# Patient Record
Sex: FEMALE | Race: AMERICAN INDIAN OR ALASKA NATIVE | NOT HISPANIC OR LATINO | Employment: OTHER | URBAN - METROPOLITAN AREA
[De-identification: names, ages, dates, MRNs, and addresses within clinical notes are randomized per-mention and may not be internally consistent; named-entity substitution may affect disease eponyms.]

---

## 2023-08-30 NOTE — H&P (VIEW-ONLY)
[facility]      Preoperative Consultation   Kayley Menjivar   : 1945   Gender: female    Date of Encounter: 2023    Pre-Op Exam (23/Alesha Zayas/Dr. Chandra/Right knee replacement/Fax: [phone])           History of Present Illness   78 yo otherwise health female patient who presents for preoperative clearance for right knee replacement. She has very good endurance playing pickle ball and biking on her recumbent bike regularly.      Review of Systems   A comprehensive review of systems was negative except for items noted in HPI. The patient denies any fevers, chills, nausea, vomiting, diarrhea, constipation, dysuria, hematuria, or urinary hesitancy or incontinence.  No shortness of breath, chest pain, headaches, or rashes.      Patient Active Problem List   Diagnosis Code     Abdominal or pelvic swelling, mass or lump, unspecified site R19.00     Other specified counseling Z71.89     Osteopenia M85.80     Special screening for malignant neoplasms, colon Z12.11     Mammogram declined Z53.20     Current Outpatient Medications   Medication Sig     Cholecalciferol, Vitamin D3, 400 unit capsule Take by mouth.     diclofenac (VOLTAREN) 75 mg delayed-release tablet Take 75 mg by mouth two times daily with meals.     docusate (COLACE) 100 mg tablet Take by mouth once daily.     glucos sul 2KCl-msm-chond-C-Mn (Glucosamine Chondroitin) 550-30-1 mg cap      Omega-3-DHA-EPA-Fish Oil (Fish Oil) 1,000 mg (120 mg-180 mg) cap      No current facility-administered medications for this visit.     Medications have been reviewed by me and are current to the best of my knowledge and ability.     Allergies   Allergen Reactions     Bandaids [Adhesive Tape]      Prednisone Hives     Past Surgical History:   . Laterality Date     (IA) SURGICAL PROCEDURE      tubal ligation~D\T\C x2 ~     COLONOSCOPY N/A 2021    Dr. Sharp     SALPINGO-OOPHORECTOMY      patient can't recal which side, due to  "ovarian cyst     Social History     Tobacco Use     Smoking status: Never     Passive exposure: Never     Smokeless tobacco: Never   Vaping Use     Vaping Use: Never used   Substance Use Topics     Alcohol use: No     Comment: Alcoholic Drinks/day: 0     Drug use: No     Family History   Problem Relation Age of Onset     Genetic Other         maternal grandma breast cancer     Cancer-breast Maternal Grandmother      Other Mother          at 93     Cancer Father         colon     COPD Sister      Heart attack Sister      Colon polyps Brother      Melanoma Son        PAST DIFFICULTY WITH ANESTHESIA: None     Physical Exam   /74 (Cuff Site: Left Arm, Position: Sitting, Cuff Size: Adult Regular)   Pulse 70   Ht 1.6 m (5' 3\")   Wt 64.9 kg (143 lb)   SpO2 98%   BMI 25.33 kg/m   Body mass index is 25.33 kg/m .  Nursing notes and vitals reviewed. General - the patient is alert, oriented x3, and in no apparent distress.  HEENT - head is normocephalic/atraumatic.  Extraocular movements intact.    Sclerae and conjunctivae are clear.  Oral mucosa is moist.  Posterior pharynx is within normal limits.  Nasal passages are free of congestion.  Turbinates are clear.  Tympanic membranes are within normal limits bilaterally.  Neck is supple.  There is no lymphadenopathy, thyromegaly, or jugular venous distention.  Cardiac - regular, rate and rhythm.  No murmurs, rubs or gallops.  Lungs - clear to auscultation bilaterally.  No wheezes, rales, or rhonchi.  Integument - skin is intact, pink, warm and dry.  No rashes.  Psychiatric - the patient has normal mood, affect, and memory. The abdomen is soft without tenderness, guarding, mass, rebound or organomegaly. Bowel sounds are normal.      Assessment / Plan     The Pre-Op Tool    Recommendations      Intermediate Risk Procedure    Risk of CV Complication (RCRI)  0.5%    Current Cardiac Status  Good exertional capacity ( > 4 mets )    Cardiac History  No history of coronary " artery disease           Labs  HGB within last 30 days  EKG  Baseline EKG within the last 12 months  CXR  Not indicated    Stress Testing  Not indicated    * Testing recommendations are intended to assist, but not direct, clinical decisions.           Type & Screen should be obtained by Anesthesia only if the risk of transfusion is > 5% for the procedure          Labs  * Data supports elimination of  routine  laboratory testing in favor of focused,  indicated  testing based on medical co-morbidities. A 2009 study randomized 1061 patients undergoing ambulatory, non-cataract surgery to routine or to indicated testing. Perioperative adverse events were similar (Anesthesia & Analgesia 2009;108:467-75; Anesthesiol. Clin. 2016 Mar;34(1):43-58).  Stress Testing  * The current ACC/AHA guideline states that 'non-invasive stress testing is not useful for patients [with no clinical risk factors] undergoing noncardiac surgery' (JACC. 2014;64(21);e1-76.).     Session ID: [id number]  Endnotes and bibliography available upon request: [email or web address]      Labs:   Preop Visit on 08/30/2023   Component Date Value Ref Range Status     SODIUM 08/30/2023 131 (L)  136 - 145 mmol/L Final     POTASSIUM 08/30/2023 3.8  3.5 - 5.1 mmol/L Final     CHLORIDE 08/30/2023 96 (L)  98 - 107 mmol/L Final     CO2,TOTAL 08/30/2023 25  22 - 29 mmol/L Final     ANION GAP 08/30/2023 10  5 - 18 Final     GLUCOSE 08/30/2023 89  70 - 99 mg/dL Final     CALCIUM 08/30/2023 9.1  8.8 - 10.2 mg/dL Final     BUN 08/30/2023 9  8 - 23 mg/dL Final     CREATININE 08/30/2023 0.66  0.50 - 0.90 mg/dL Final     BUN/CREAT RATIO 08/30/2023 14  10 - 20 Final     eGFR 08/30/2023 >90  >90 mL/min/1.73m2 Final    As of 03/15/2022, eGFR is calculated by the CKD-EPI creatinine equation without race adjustment.  eGFR can be influenced by muscle mass, exercise, and diet.  The reported eGFR is an estimation only and is only applicable if the renal function is stable.      WHITE BLOOD COUNT         08/30/2023 6.0  4.5 - 11.0 thou/cu mm Final     RED BLOOD COUNT           08/30/2023 3.88 (L)  4.00 - 5.20 mil/cu mm Final     HEMOGLOBIN                08/30/2023 12.8  12.0 - 16.0 g/dL Final     HEMATOCRIT                08/30/2023 38.0  33.0 - 51.0 % Final     MCV                       08/30/2023 98  80 - 100 fL Final     MCH                       08/30/2023 33.0  26.0 - 34.0 pg Final     MCHC                      08/30/2023 33.7  32.0 - 36.0 g/dL Final     RDW                       08/30/2023 13.7  11.5 - 15.5 % Final     PLATELET COUNT            08/30/2023 273  140 - 440 thou/cu mm Final     MPV                       08/30/2023 10.1  6.5 - 11.0 fL Final     % NEUT 08/30/2023 72.7  % Final     % LYMPH 08/30/2023 20.9  % Final     % MXD (MONOS,EOS,BASOS) 08/30/2023 6.4  % Final     ABSOLUTE NEUTROPHILS      08/30/2023 4.3  1.7 - 7.0 thou/cu mm Final     ABSOLUTE LYMPHOCYTES      08/30/2023 1.3  0.9 - 2.9 thou/cu mm Final     MIXED ABS 3 PART 08/30/2023 0.4  0.2 - 1.2 thou/cu mm Final         ECG: EKG was reviewed by myself and shows a normal sinus rhythm.  Positive axis, good R wave progression and no evidence of ischemia.       ICD-10-CM    1. Pre-op evaluation  Z01.818 NM READING EKG - NO CHARGE, COMP ONLY     EKG 12 LEAD     NM ECG ROUTINE ECG W/LEAST 12 LDS W/I&R     CBC AND DIFFERENTIAL     BASIC METABOLIC PANEL        Patient is cleared for planned procedure.   Electronically Signed by:   MYNOR Johns ....................  8/31/2023   12:51 PM

## 2023-09-20 NOTE — PROGRESS NOTES
Pre-op Total Joint Patient Screening    Do you have a ride available to come to the hospital the day after your surgery by 8am with anticipated discharge of 11am? Y  What is the name of this person? Marquise(spouse)  Do you have a  set up after surgery? Y  Will your  be the same person that gives you a ride home after surgery? Y  Have you received the Joint Replacement Guidebook? Y  Do you have any questions about your guidebook? N  Have you signed up for Epic Care Companion? N  Have you signed up for MY Chart access? N

## 2023-09-21 RX ORDER — DICLOFENAC SODIUM 75 MG/1
75 TABLET, DELAYED RELEASE ORAL 2 TIMES DAILY
Status: ON HOLD | COMMUNITY
End: 2023-09-26

## 2023-09-21 RX ORDER — HYDROCORTISONE ACETATE 0.5 %
2 CREAM (GRAM) TOPICAL DAILY
COMMUNITY

## 2023-09-21 RX ORDER — ACETAMINOPHEN 500 MG
500-1000 TABLET ORAL EVERY 6 HOURS PRN
Status: ON HOLD | COMMUNITY
End: 2023-09-25

## 2023-09-21 RX ORDER — CHOLECALCIFEROL (VITAMIN D3) 50 MCG
1 TABLET ORAL DAILY
COMMUNITY

## 2023-09-21 RX ORDER — AMPICILLIN TRIHYDRATE 250 MG
600 CAPSULE ORAL DAILY
COMMUNITY

## 2023-09-21 RX ORDER — AMOXICILLIN 500 MG
1200 CAPSULE ORAL DAILY
COMMUNITY

## 2023-09-22 RX ORDER — ASPIRIN 81 MG
100 TABLET, DELAYED RELEASE (ENTERIC COATED) ORAL DAILY
COMMUNITY

## 2023-09-22 NOTE — PROGRESS NOTES
PTA medications updated by Medication Scribe prior to surgery via phone call with patient (last doses completed by Nurse)     Medication history sources: Patient, H&P, and Patient's home med list  In the past week, patient estimated taking medication this percent of the time: Greater than 90%      Significant changes made to the medication list:  None      Additional medication history information:   None    Medication reconciliation completed by provider prior to medication history? No    Time spent in this activity: 30 minutes    The information provided in this note is only as accurate as the sources available at the time of update(s)      Prior to Admission medications    Medication Sig Last Dose Taking? Auth Provider Long Term End Date   acetaminophen (TYLENOL) 500 MG tablet Take 500-1,000 mg by mouth every 6 hours as needed for mild pain Unknown at PRN Yes Reported, Patient     diclofenac (VOLTAREN) 75 MG EC tablet Take 75 mg by mouth 2 times daily 9/19/2023 at PM Yes Reported, Patient Yes    docusate sodium (COLACE) 100 MG tablet Take 100 mg by mouth daily  at AM Yes Reported, Patient     Glucosamine-Chondroit-Vit C-Mn (GLUCOSAMINE 1500 COMPLEX) CAPS Take 2 capsules by mouth daily 9/15/2023 at AM Yes Reported, Patient     Omega-3 Fatty Acids (FISH OIL) 1200 MG capsule Take 1,200 mg by mouth daily 9/15/2023 at AM Yes Reported, Patient     red yeast rice 600 MG CAPS Take 600 mg by mouth daily 9/15/2023 at AM Yes Reported, Patient     vitamin D3 (CHOLECALCIFEROL) 50 mcg (2000 units) tablet Take 1 tablet by mouth daily 9/15/2023 at AM Yes Reported, Patient

## 2023-09-24 ENCOUNTER — ANESTHESIA EVENT (OUTPATIENT)
Dept: SURGERY | Facility: CLINIC | Age: 78
End: 2023-09-24
Payer: COMMERCIAL

## 2023-09-24 NOTE — ANESTHESIA PREPROCEDURE EVALUATION
Anesthesia Pre-Procedure Evaluation    Patient: Kayley Menjivar   MRN: [id number] : 1945        Procedure : Procedure(s):  RIGHT TOTAL KNEE ARTHROPLASTY          Past Medical History:   Diagnosis Date    Osteopenia       Past Surgical History:   Procedure Laterality Date     SECTION      COLONOSCOPY      SALPINGO OOPHORECTOMY,R/L/MARYBETH      pt unsure of which side    TUBAL LIGATION        Allergies   Allergen Reactions    Adhesive Tape      bandaids    Prednisone Hives      Social History     Tobacco Use    Smoking status: Never    Smokeless tobacco: Never   Substance Use Topics    Alcohol use: Not Currently      Wt Readings from Last 1 Encounters:   No data found for Wt        Anesthesia Evaluation            ROS/MED HX  ENT/Pulmonary:  - neg pulmonary ROS  (-) sleep apnea   Neurologic:  - neg neurologic ROS     Cardiovascular:  - neg cardiovascular ROS     METS/Exercise Tolerance:     Hematologic:       Musculoskeletal: Comment: osteopenia  (+)  arthritis,             GI/Hepatic:  - neg GI/hepatic ROS     Renal/Genitourinary:  - neg Renal ROS     Endo:       Psychiatric/Substance Use:  - neg psychiatric ROS     Infectious Disease:  - neg infectious disease ROS     Malignancy:       Other:            Physical Exam    Airway        Mallampati: II   TM distance: > 3 FB   Neck ROM: full   Mouth opening: > 3 cm    Respiratory Devices and Support         Dental       (+) Minor Abnormalities - some fillings, tiny chips      Cardiovascular   cardiovascular exam normal          Pulmonary   pulmonary exam normal                OUTSIDE LABS:  CBC: No results found for: WBC, HGB, HCT, PLT  BMP: No results found for: NA, POTASSIUM, CHLORIDE, CO2, BUN, CR, GLC  COAGS: No results found for: PTT, INR, FIBR  POC: No results found for: BGM, HCG, HCGS  HEPATIC: No results found for: ALBUMIN, PROTTOTAL, ALT, AST, GGT, ALKPHOS, BILITOTAL, BILIDIRECT, JASON  OTHER: No results found for: PH, LACT, A1C, ALEKSANDAR, PHOS, MAG,  LIPASE, AMYLASE, TSH, T4, T3, CRP, SED    Anesthesia Plan    ASA Status:  2    NPO Status:  NPO Appropriate    Anesthesia Type: Spinal.              Consents    Anesthesia Plan(s) and associated risks, benefits, and realistic alternatives discussed. Questions answered and patient/representative(s) expressed understanding.     - Discussed:     - Discussed with:  Patient            Postoperative Care    Pain management: IV analgesics, Peripheral nerve block (Single Shot), Multi-modal analgesia.   PONV prophylaxis: Ondansetron (or other 5HT-3), Dexamethasone or Solumedrol, Background Propofol Infusion     Comments:                Young Lopez, DO, DO

## 2023-09-25 ENCOUNTER — APPOINTMENT (OUTPATIENT)
Dept: GENERAL RADIOLOGY | Facility: CLINIC | Age: 78
End: 2023-09-25
Attending: ORTHOPAEDIC SURGERY
Payer: COMMERCIAL

## 2023-09-25 ENCOUNTER — APPOINTMENT (OUTPATIENT)
Dept: PHYSICAL THERAPY | Facility: CLINIC | Age: 78
End: 2023-09-25
Attending: ORTHOPAEDIC SURGERY
Payer: COMMERCIAL

## 2023-09-25 ENCOUNTER — ANESTHESIA (OUTPATIENT)
Dept: SURGERY | Facility: CLINIC | Age: 78
End: 2023-09-25
Payer: COMMERCIAL

## 2023-09-25 ENCOUNTER — HOSPITAL ENCOUNTER (OUTPATIENT)
Facility: CLINIC | Age: 78
Discharge: HOME | End: 2023-09-26
Attending: ORTHOPAEDIC SURGERY | Admitting: ORTHOPAEDIC SURGERY
Payer: COMMERCIAL

## 2023-09-25 DIAGNOSIS — Z96.651 TOTAL KNEE REPLACEMENT STATUS, RIGHT: Primary | ICD-10-CM

## 2023-09-25 LAB — GLUCOSE BLDC GLUCOMTR-MCNC: 86 MG/DL (ref 70–99)

## 2023-09-25 PROCEDURE — 258N000003 HC RX IP 258 OP 636: Performed by: ANESTHESIOLOGY

## 2023-09-25 PROCEDURE — 250N000009 HC RX 250: Performed by: ORTHOPAEDIC SURGERY

## 2023-09-25 PROCEDURE — 250N000009 HC RX 250: Performed by: ANESTHESIOLOGY

## 2023-09-25 PROCEDURE — 250N000011 HC RX IP 250 OP 636: Mod: JZ | Performed by: ORTHOPAEDIC SURGERY

## 2023-09-25 PROCEDURE — 250N000011 HC RX IP 250 OP 636: Performed by: ORTHOPAEDIC SURGERY

## 2023-09-25 PROCEDURE — 97530 THERAPEUTIC ACTIVITIES: CPT | Mod: GP

## 2023-09-25 PROCEDURE — 272N000001 HC OR GENERAL SUPPLY STERILE: Performed by: ORTHOPAEDIC SURGERY

## 2023-09-25 PROCEDURE — 999N000063 XR KNEE PORT RIGHT 1/2 VIEWS: Mod: RT

## 2023-09-25 PROCEDURE — 250N000011 HC RX IP 250 OP 636: Performed by: ANESTHESIOLOGY

## 2023-09-25 PROCEDURE — 258N000001 HC RX 258: Performed by: ORTHOPAEDIC SURGERY

## 2023-09-25 PROCEDURE — 97161 PT EVAL LOW COMPLEX 20 MIN: CPT | Mod: GP

## 2023-09-25 PROCEDURE — 710N000009 HC RECOVERY PHASE 1, LEVEL 1, PER MIN: Performed by: ORTHOPAEDIC SURGERY

## 2023-09-25 PROCEDURE — 999N000141 HC STATISTIC PRE-PROCEDURE NURSING ASSESSMENT: Performed by: ORTHOPAEDIC SURGERY

## 2023-09-25 PROCEDURE — 258N000003 HC RX IP 258 OP 636: Performed by: ORTHOPAEDIC SURGERY

## 2023-09-25 PROCEDURE — 250N000025 HC SEVOFLURANE, PER MIN: Performed by: ORTHOPAEDIC SURGERY

## 2023-09-25 PROCEDURE — 82962 GLUCOSE BLOOD TEST: CPT

## 2023-09-25 PROCEDURE — 360N000077 HC SURGERY LEVEL 4, PER MIN: Performed by: ORTHOPAEDIC SURGERY

## 2023-09-25 PROCEDURE — 370N000017 HC ANESTHESIA TECHNICAL FEE, PER MIN: Performed by: ORTHOPAEDIC SURGERY

## 2023-09-25 PROCEDURE — 97110 THERAPEUTIC EXERCISES: CPT | Mod: GP

## 2023-09-25 PROCEDURE — 250N000013 HC RX MED GY IP 250 OP 250 PS 637: Performed by: ORTHOPAEDIC SURGERY

## 2023-09-25 PROCEDURE — C1776 JOINT DEVICE (IMPLANTABLE): HCPCS | Performed by: ORTHOPAEDIC SURGERY

## 2023-09-25 PROCEDURE — C1713 ANCHOR/SCREW BN/BN,TIS/BN: HCPCS | Performed by: ORTHOPAEDIC SURGERY

## 2023-09-25 DEVICE — BONE CEMENT RADIOPAQUE SIMPLEX HV FULL DOSE 6194-1-001: Type: IMPLANTABLE DEVICE | Site: KNEE | Status: FUNCTIONAL

## 2023-09-25 DEVICE — IMP COMP PATELLA BIOM 3 PEG 34MM STD 184766: Type: IMPLANTABLE DEVICE | Site: KNEE | Status: FUNCTIONAL

## 2023-09-25 DEVICE — IMPLANTABLE DEVICE
Type: IMPLANTABLE DEVICE | Site: KNEE | Status: FUNCTIONAL
Brand: VANGUARD® KNEE SYSTEM

## 2023-09-25 DEVICE — IMP COMP TIBIAL KNEE ASCENT 71MM 141223: Type: IMPLANTABLE DEVICE | Site: KNEE | Status: FUNCTIONAL

## 2023-09-25 DEVICE — IMP COMP FEMORAL VANGARD BIOM FIXATION 183099: Type: IMPLANTABLE DEVICE | Site: KNEE | Status: FUNCTIONAL

## 2023-09-25 DEVICE — IMP COMP FEMORAL VANGARD BIOM PS RT 62.5MM 183106: Type: IMPLANTABLE DEVICE | Site: KNEE | Status: FUNCTIONAL

## 2023-09-25 RX ORDER — ONDANSETRON 4 MG/1
4 TABLET, ORALLY DISINTEGRATING ORAL EVERY 30 MIN PRN
Status: DISCONTINUED | OUTPATIENT
Start: 2023-09-25 | End: 2023-09-25 | Stop reason: HOSPADM

## 2023-09-25 RX ORDER — LIDOCAINE 40 MG/G
CREAM TOPICAL
Status: DISCONTINUED | OUTPATIENT
Start: 2023-09-25 | End: 2023-09-25

## 2023-09-25 RX ORDER — TRANEXAMIC ACID 10 MG/ML
1 INJECTION, SOLUTION INTRAVENOUS ONCE
Status: COMPLETED | OUTPATIENT
Start: 2023-09-25 | End: 2023-09-25

## 2023-09-25 RX ORDER — HYDROMORPHONE HCL IN WATER/PF 6 MG/30 ML
0.4 PATIENT CONTROLLED ANALGESIA SYRINGE INTRAVENOUS
Status: DISCONTINUED | OUTPATIENT
Start: 2023-09-25 | End: 2023-09-26 | Stop reason: HOSPADM

## 2023-09-25 RX ORDER — ONDANSETRON 2 MG/ML
INJECTION INTRAMUSCULAR; INTRAVENOUS PRN
Status: DISCONTINUED | OUTPATIENT
Start: 2023-09-25 | End: 2023-09-25

## 2023-09-25 RX ORDER — ACETAMINOPHEN 325 MG/1
650 TABLET ORAL EVERY 4 HOURS PRN
Status: DISCONTINUED | OUTPATIENT
Start: 2023-09-28 | End: 2023-09-26 | Stop reason: HOSPADM

## 2023-09-25 RX ORDER — HYDROMORPHONE HCL IN WATER/PF 6 MG/30 ML
0.2 PATIENT CONTROLLED ANALGESIA SYRINGE INTRAVENOUS
Status: DISCONTINUED | OUTPATIENT
Start: 2023-09-25 | End: 2023-09-26 | Stop reason: HOSPADM

## 2023-09-25 RX ORDER — DOCUSATE SODIUM 100 MG/1
100 CAPSULE, LIQUID FILLED ORAL DAILY
Status: DISCONTINUED | OUTPATIENT
Start: 2023-09-25 | End: 2023-09-26 | Stop reason: HOSPADM

## 2023-09-25 RX ORDER — PROPOFOL 10 MG/ML
INJECTION, EMULSION INTRAVENOUS PRN
Status: DISCONTINUED | OUTPATIENT
Start: 2023-09-25 | End: 2023-09-25

## 2023-09-25 RX ORDER — SODIUM CHLORIDE, SODIUM LACTATE, POTASSIUM CHLORIDE, CALCIUM CHLORIDE 600; 310; 30; 20 MG/100ML; MG/100ML; MG/100ML; MG/100ML
INJECTION, SOLUTION INTRAVENOUS CONTINUOUS
Status: DISCONTINUED | OUTPATIENT
Start: 2023-09-25 | End: 2023-09-25

## 2023-09-25 RX ORDER — OXYCODONE HYDROCHLORIDE 5 MG/1
5-10 TABLET ORAL EVERY 6 HOURS PRN
Qty: 33 TABLET | Refills: 0 | Status: SHIPPED | OUTPATIENT
Start: 2023-09-25 | End: 2023-09-25

## 2023-09-25 RX ORDER — ONDANSETRON 2 MG/ML
4 INJECTION INTRAMUSCULAR; INTRAVENOUS EVERY 30 MIN PRN
Status: DISCONTINUED | OUTPATIENT
Start: 2023-09-25 | End: 2023-09-25 | Stop reason: HOSPADM

## 2023-09-25 RX ORDER — OXYCODONE HYDROCHLORIDE 5 MG/1
5-10 TABLET ORAL EVERY 6 HOURS PRN
Qty: 33 TABLET | Refills: 0 | Status: SHIPPED | OUTPATIENT
Start: 2023-09-25 | End: 2023-09-26

## 2023-09-25 RX ORDER — GLYCOPYRROLATE 0.2 MG/ML
INJECTION, SOLUTION INTRAMUSCULAR; INTRAVENOUS PRN
Status: DISCONTINUED | OUTPATIENT
Start: 2023-09-25 | End: 2023-09-25

## 2023-09-25 RX ORDER — HYDROXYZINE HYDROCHLORIDE 10 MG/1
10 TABLET, FILM COATED ORAL EVERY 6 HOURS PRN
Qty: 40 TABLET | Refills: 0 | Status: SHIPPED | OUTPATIENT
Start: 2023-09-25

## 2023-09-25 RX ORDER — HYDROMORPHONE HCL IN WATER/PF 6 MG/30 ML
0.4 PATIENT CONTROLLED ANALGESIA SYRINGE INTRAVENOUS EVERY 5 MIN PRN
Status: DISCONTINUED | OUTPATIENT
Start: 2023-09-25 | End: 2023-09-25 | Stop reason: HOSPADM

## 2023-09-25 RX ORDER — CEFAZOLIN SODIUM 1 G/3ML
1 INJECTION, POWDER, FOR SOLUTION INTRAMUSCULAR; INTRAVENOUS EVERY 8 HOURS
Status: COMPLETED | OUTPATIENT
Start: 2023-09-25 | End: 2023-09-26

## 2023-09-25 RX ORDER — ACETAMINOPHEN 325 MG/1
650 TABLET ORAL EVERY 4 HOURS PRN
Qty: 100 TABLET | Refills: 0 | Status: SHIPPED | OUTPATIENT
Start: 2023-09-25

## 2023-09-25 RX ORDER — SODIUM CHLORIDE, SODIUM LACTATE, POTASSIUM CHLORIDE, CALCIUM CHLORIDE 600; 310; 30; 20 MG/100ML; MG/100ML; MG/100ML; MG/100ML
INJECTION, SOLUTION INTRAVENOUS CONTINUOUS
Status: DISCONTINUED | OUTPATIENT
Start: 2023-09-25 | End: 2023-09-25 | Stop reason: HOSPADM

## 2023-09-25 RX ORDER — PROPOFOL 10 MG/ML
INJECTION, EMULSION INTRAVENOUS CONTINUOUS PRN
Status: DISCONTINUED | OUTPATIENT
Start: 2023-09-25 | End: 2023-09-25

## 2023-09-25 RX ORDER — CEFAZOLIN SODIUM/WATER 2 G/20 ML
2 SYRINGE (ML) INTRAVENOUS
Status: COMPLETED | OUTPATIENT
Start: 2023-09-25 | End: 2023-09-25

## 2023-09-25 RX ORDER — ONDANSETRON 4 MG/1
4 TABLET, ORALLY DISINTEGRATING ORAL EVERY 6 HOURS PRN
Status: DISCONTINUED | OUTPATIENT
Start: 2023-09-25 | End: 2023-09-26 | Stop reason: HOSPADM

## 2023-09-25 RX ORDER — ACETAMINOPHEN 325 MG/1
975 TABLET ORAL EVERY 8 HOURS
Status: DISCONTINUED | OUTPATIENT
Start: 2023-09-25 | End: 2023-09-26 | Stop reason: HOSPADM

## 2023-09-25 RX ORDER — FENTANYL CITRATE 50 UG/ML
INJECTION, SOLUTION INTRAMUSCULAR; INTRAVENOUS PRN
Status: DISCONTINUED | OUTPATIENT
Start: 2023-09-25 | End: 2023-09-25

## 2023-09-25 RX ORDER — POLYETHYLENE GLYCOL 3350 17 G/17G
17 POWDER, FOR SOLUTION ORAL DAILY
Status: DISCONTINUED | OUTPATIENT
Start: 2023-09-26 | End: 2023-09-26 | Stop reason: HOSPADM

## 2023-09-25 RX ORDER — FENTANYL CITRATE 0.05 MG/ML
50 INJECTION, SOLUTION INTRAMUSCULAR; INTRAVENOUS EVERY 5 MIN PRN
Status: DISCONTINUED | OUTPATIENT
Start: 2023-09-25 | End: 2023-09-25 | Stop reason: HOSPADM

## 2023-09-25 RX ORDER — FENTANYL CITRATE 0.05 MG/ML
25 INJECTION, SOLUTION INTRAMUSCULAR; INTRAVENOUS EVERY 5 MIN PRN
Status: DISCONTINUED | OUTPATIENT
Start: 2023-09-25 | End: 2023-09-25 | Stop reason: HOSPADM

## 2023-09-25 RX ORDER — OXYCODONE HYDROCHLORIDE 5 MG/1
5 TABLET ORAL EVERY 4 HOURS PRN
Status: DISCONTINUED | OUTPATIENT
Start: 2023-09-25 | End: 2023-09-26 | Stop reason: HOSPADM

## 2023-09-25 RX ORDER — NALOXONE HYDROCHLORIDE 0.4 MG/ML
0.2 INJECTION, SOLUTION INTRAMUSCULAR; INTRAVENOUS; SUBCUTANEOUS
Status: DISCONTINUED | OUTPATIENT
Start: 2023-09-25 | End: 2023-09-26 | Stop reason: HOSPADM

## 2023-09-25 RX ORDER — LIDOCAINE 40 MG/G
CREAM TOPICAL
Status: DISCONTINUED | OUTPATIENT
Start: 2023-09-25 | End: 2023-09-26 | Stop reason: HOSPADM

## 2023-09-25 RX ORDER — AMOXICILLIN 250 MG
1 CAPSULE ORAL 2 TIMES DAILY
Status: DISCONTINUED | OUTPATIENT
Start: 2023-09-25 | End: 2023-09-26 | Stop reason: HOSPADM

## 2023-09-25 RX ORDER — CEFAZOLIN SODIUM/WATER 2 G/20 ML
2 SYRINGE (ML) INTRAVENOUS SEE ADMIN INSTRUCTIONS
Status: DISCONTINUED | OUTPATIENT
Start: 2023-09-25 | End: 2023-09-25

## 2023-09-25 RX ORDER — SODIUM CHLORIDE 9 MG/ML
INJECTION, SOLUTION INTRAVENOUS CONTINUOUS
Status: DISCONTINUED | OUTPATIENT
Start: 2023-09-25 | End: 2023-09-26 | Stop reason: HOSPADM

## 2023-09-25 RX ORDER — HYDROMORPHONE HCL IN WATER/PF 6 MG/30 ML
0.2 PATIENT CONTROLLED ANALGESIA SYRINGE INTRAVENOUS EVERY 5 MIN PRN
Status: DISCONTINUED | OUTPATIENT
Start: 2023-09-25 | End: 2023-09-25 | Stop reason: HOSPADM

## 2023-09-25 RX ORDER — ASPIRIN 81 MG/1
81 TABLET ORAL 2 TIMES DAILY
Status: DISCONTINUED | OUTPATIENT
Start: 2023-09-26 | End: 2023-09-26 | Stop reason: HOSPADM

## 2023-09-25 RX ORDER — NALOXONE HYDROCHLORIDE 0.4 MG/ML
0.4 INJECTION, SOLUTION INTRAMUSCULAR; INTRAVENOUS; SUBCUTANEOUS
Status: DISCONTINUED | OUTPATIENT
Start: 2023-09-25 | End: 2023-09-26 | Stop reason: HOSPADM

## 2023-09-25 RX ORDER — ASPIRIN 81 MG/1
81 TABLET ORAL 2 TIMES DAILY
Qty: 60 TABLET | Refills: 0 | Status: SHIPPED | OUTPATIENT
Start: 2023-09-25 | End: 2023-10-25

## 2023-09-25 RX ORDER — PROCHLORPERAZINE MALEATE 5 MG
5 TABLET ORAL EVERY 6 HOURS PRN
Status: DISCONTINUED | OUTPATIENT
Start: 2023-09-25 | End: 2023-09-26 | Stop reason: HOSPADM

## 2023-09-25 RX ORDER — AMOXICILLIN 250 MG
1-2 CAPSULE ORAL 2 TIMES DAILY
Qty: 30 TABLET | Refills: 0 | Status: SHIPPED | OUTPATIENT
Start: 2023-09-25

## 2023-09-25 RX ORDER — DEXAMETHASONE SODIUM PHOSPHATE 4 MG/ML
INJECTION, SOLUTION INTRA-ARTICULAR; INTRALESIONAL; INTRAMUSCULAR; INTRAVENOUS; SOFT TISSUE PRN
Status: DISCONTINUED | OUTPATIENT
Start: 2023-09-25 | End: 2023-09-25

## 2023-09-25 RX ORDER — ACETAMINOPHEN 500 MG
500-1000 TABLET ORAL EVERY 6 HOURS PRN
Status: DISCONTINUED | OUTPATIENT
Start: 2023-09-25 | End: 2023-09-25

## 2023-09-25 RX ORDER — OXYCODONE HYDROCHLORIDE 5 MG/1
10 TABLET ORAL EVERY 4 HOURS PRN
Status: DISCONTINUED | OUTPATIENT
Start: 2023-09-25 | End: 2023-09-26 | Stop reason: HOSPADM

## 2023-09-25 RX ORDER — HYDROXYZINE HYDROCHLORIDE 10 MG/1
10 TABLET, FILM COATED ORAL EVERY 6 HOURS PRN
Status: DISCONTINUED | OUTPATIENT
Start: 2023-09-25 | End: 2023-09-26 | Stop reason: HOSPADM

## 2023-09-25 RX ORDER — BISACODYL 10 MG
10 SUPPOSITORY, RECTAL RECTAL DAILY PRN
Status: DISCONTINUED | OUTPATIENT
Start: 2023-09-25 | End: 2023-09-26 | Stop reason: HOSPADM

## 2023-09-25 RX ORDER — LIDOCAINE HYDROCHLORIDE 20 MG/ML
INJECTION, SOLUTION INFILTRATION; PERINEURAL PRN
Status: DISCONTINUED | OUTPATIENT
Start: 2023-09-25 | End: 2023-09-25

## 2023-09-25 RX ORDER — VANCOMYCIN HYDROCHLORIDE 1 G/20ML
INJECTION, POWDER, LYOPHILIZED, FOR SOLUTION INTRAVENOUS PRN
Status: DISCONTINUED | OUTPATIENT
Start: 2023-09-25 | End: 2023-09-25 | Stop reason: HOSPADM

## 2023-09-25 RX ORDER — ONDANSETRON 2 MG/ML
4 INJECTION INTRAMUSCULAR; INTRAVENOUS EVERY 6 HOURS PRN
Status: DISCONTINUED | OUTPATIENT
Start: 2023-09-25 | End: 2023-09-26 | Stop reason: HOSPADM

## 2023-09-25 RX ADMIN — MEPIVACAINE HYDROCHLORIDE 50 MG: 20 INJECTION, SOLUTION EPIDURAL; INFILTRATION at 10:39

## 2023-09-25 RX ADMIN — GLYCOPYRROLATE 0.2 MG: 0.2 INJECTION, SOLUTION INTRAMUSCULAR; INTRAVENOUS at 10:55

## 2023-09-25 RX ADMIN — PROPOFOL 150 MCG/KG/MIN: 10 INJECTION, EMULSION INTRAVENOUS at 10:46

## 2023-09-25 RX ADMIN — DEXAMETHASONE SODIUM PHOSPHATE 10 MG: 4 INJECTION, SOLUTION INTRA-ARTICULAR; INTRALESIONAL; INTRAMUSCULAR; INTRAVENOUS; SOFT TISSUE at 11:03

## 2023-09-25 RX ADMIN — BUPIVACAINE HYDROCHLORIDE 15 ML: 5 INJECTION, SOLUTION EPIDURAL; INTRACAUDAL at 10:15

## 2023-09-25 RX ADMIN — MIDAZOLAM 1 MG: 1 INJECTION INTRAMUSCULAR; INTRAVENOUS at 09:46

## 2023-09-25 RX ADMIN — TRANEXAMIC ACID 1 G: 10 INJECTION, SOLUTION INTRAVENOUS at 12:02

## 2023-09-25 RX ADMIN — PROPOFOL 120 MCG/KG/MIN: 10 INJECTION, EMULSION INTRAVENOUS at 10:52

## 2023-09-25 RX ADMIN — OXYCODONE HYDROCHLORIDE 10 MG: 5 TABLET ORAL at 16:08

## 2023-09-25 RX ADMIN — SODIUM CHLORIDE, POTASSIUM CHLORIDE, SODIUM LACTATE AND CALCIUM CHLORIDE: 600; 310; 30; 20 INJECTION, SOLUTION INTRAVENOUS at 09:47

## 2023-09-25 RX ADMIN — LIDOCAINE HYDROCHLORIDE 40 MG: 20 INJECTION, SOLUTION INFILTRATION; PERINEURAL at 10:52

## 2023-09-25 RX ADMIN — FENTANYL CITRATE 100 MCG: 50 INJECTION, SOLUTION INTRAMUSCULAR; INTRAVENOUS at 10:52

## 2023-09-25 RX ADMIN — SODIUM CHLORIDE, POTASSIUM CHLORIDE, SODIUM LACTATE AND CALCIUM CHLORIDE: 600; 310; 30; 20 INJECTION, SOLUTION INTRAVENOUS at 11:32

## 2023-09-25 RX ADMIN — CEFAZOLIN 1 G: 1 INJECTION, POWDER, FOR SOLUTION INTRAMUSCULAR; INTRAVENOUS at 18:33

## 2023-09-25 RX ADMIN — SODIUM CHLORIDE: 9 INJECTION, SOLUTION INTRAVENOUS at 15:07

## 2023-09-25 RX ADMIN — PHENYLEPHRINE HYDROCHLORIDE 50 MCG: 10 INJECTION INTRAVENOUS at 12:14

## 2023-09-25 RX ADMIN — ACETAMINOPHEN 975 MG: 325 TABLET, FILM COATED ORAL at 15:02

## 2023-09-25 RX ADMIN — Medication 2 G: at 10:45

## 2023-09-25 RX ADMIN — OXYCODONE HYDROCHLORIDE 5 MG: 5 TABLET ORAL at 22:38

## 2023-09-25 RX ADMIN — PHENYLEPHRINE HYDROCHLORIDE 50 MCG: 10 INJECTION INTRAVENOUS at 11:54

## 2023-09-25 RX ADMIN — PHENYLEPHRINE HYDROCHLORIDE 50 MCG: 10 INJECTION INTRAVENOUS at 12:03

## 2023-09-25 RX ADMIN — ONDANSETRON 4 MG: 2 INJECTION INTRAMUSCULAR; INTRAVENOUS at 10:45

## 2023-09-25 RX ADMIN — DOCUSATE SODIUM 100 MG: 100 CAPSULE, LIQUID FILLED ORAL at 15:02

## 2023-09-25 RX ADMIN — HYDROMORPHONE HYDROCHLORIDE 0.25 MG: 1 INJECTION, SOLUTION INTRAMUSCULAR; INTRAVENOUS; SUBCUTANEOUS at 11:30

## 2023-09-25 RX ADMIN — PROPOFOL 150 MG: 10 INJECTION, EMULSION INTRAVENOUS at 10:52

## 2023-09-25 RX ADMIN — OXYCODONE HYDROCHLORIDE 5 MG: 5 TABLET ORAL at 21:39

## 2023-09-25 RX ADMIN — SENNOSIDES AND DOCUSATE SODIUM 1 TABLET: 50; 8.6 TABLET ORAL at 20:57

## 2023-09-25 RX ADMIN — TRANEXAMIC ACID 1 G: 10 INJECTION, SOLUTION INTRAVENOUS at 10:57

## 2023-09-25 RX ADMIN — ACETAMINOPHEN 975 MG: 325 TABLET, FILM COATED ORAL at 21:39

## 2023-09-25 RX ADMIN — PHENYLEPHRINE HYDROCHLORIDE 200 MCG: 10 INJECTION INTRAVENOUS at 11:01

## 2023-09-25 ASSESSMENT — ACTIVITIES OF DAILY LIVING (ADL)
ADLS_ACUITY_SCORE: 20

## 2023-09-25 NOTE — ANESTHESIA CARE TRANSFER NOTE
Patient: Kayley Menjivar    Procedure: Procedure(s):  RIGHT TOTAL KNEE ARTHROPLASTY       Diagnosis: Osteoarthritis of right knee [M17.11]  Diagnosis Additional Information: No value filed.    Anesthesia Type:   Spinal     Note:    Oropharynx: oropharynx clear of all foreign objects  Level of Consciousness: awake  Oxygen Supplementation: face mask  Level of Supplemental Oxygen (L/min / FiO2): 6  Independent Airway: airway patency satisfactory and stable  Dentition: dentition unchanged  Vital Signs Stable: post-procedure vital signs reviewed and stable  Report to RN Given: handoff report given  Patient transferred to: PACU    Handoff Report: Identifed the Patient, Identified the Reponsible Provider, Reviewed the pertinent medical history, Discussed the surgical course, Reviewed Intra-OP anesthesia mangement and issues during anesthesia, Set expectations for post-procedure period and Allowed opportunity for questions and acknowledgement of understanding      Vitals:  Vitals Value Taken Time   /69 09/25/23 1246   Temp     Pulse 79 09/25/23 1248   Resp 9 09/25/23 1248   SpO2 99 % 09/25/23 1248   Vitals shown include unvalidated device data.    Electronically Signed By: ELIJAH Mejia CRNA  September 25, 2023  12:50 PM

## 2023-09-25 NOTE — ANESTHESIA PROCEDURE NOTES
"Adductor canal and Femoral Procedure Note    Pre-Procedure   Staff -        Anesthesiologist:  Jacob Hilton MD       Performed By: Anesthesiologist       Location: pre-op       Pre-Anesthestic Checklist: patient identified, IV checked, site marked, risks and benefits discussed, informed consent, monitors and equipment checked, pre-op evaluation, at physician/surgeon's request and post-op pain management  Timeout:       Correct Patient: Yes        Correct Procedure: Yes        Correct Site: Yes        Correct Position: Yes        Correct Laterality: Yes        Site Marked: Yes  Procedure Documentation  Procedure: Adductor canal, Femoral       Laterality: right       Patient Position: supine       Patient Prep/Sterile Barriers: sterile gloves, mask, \"No-touch\" technique       Skin prep: Chloraprep       Local skin infiltrated with 3 mL of 1% lidocaine.        Insertion site: upper, medial thigh.       Needle Type: insulated       Needle Gauge: 22.        Needle Length (millimeters): 90        Ultrasound guided (permanent image entered into patient's record)       1. Ultrasound was used to identify targeted nerve, plexus, vascular marker, or fascial plane and place a needle adjacent to it in real-time.       2. Ultrasound was used to visualize the spread of anesthetic in close proximity to the above referenced structure.       3. A permanent image is entered into the patient's record.       4. The visualized anatomic structures appeared normal.       5. There were no apparent abnormal pathologic findings.    Assessment/Narrative         The placement was negative for: blood aspirated, painful injection and site bleeding       Paresthesias: No.       Test dose of mL at.         Test dose negative, 3 minutes after injection, for signs of intravascular, subdural, or intrathecal injection.       Bolus given via needle..        Secured via.        Insertion/Infusion Method: Single Shot       Complications: none     " "  Injection made incrementally with aspirations every 5 mL.    Medication(s) Administered   Bupivacaine 0.5% w/ 1:400K Epi (Injection) - Injection   15 mL - 9/25/2023 10:15:00 AM   Comments:  15 cc of 0.5% Bupivacaine with epinephrine (1:400K) injected on the anterior side of the femoral artery, in close proximity to the femoral nerve branches via the adductor canal approach.    Pt tolerated well.    No complications.      Ultrasound Interpretation, Peripheral Nerve Block    1. Under ultrasound guidance, the needle was inserted and placed in close proximity to the target nerve(s).  2. Ultrasound was also used to visualize the spread of the anesthetic in close proximity to the nerve(s) being blocked.  Local anesthetic was administered in incremental doses, with intermittent negative aspiration.    3. The nerve(s) appeared anatomically normal.  4. There were no apparent abnormal pathological findings.  5. A permanent ultrasound image was saved in the patient's record.    The surgeon has given a verbal order transferring care of this patient to me for the performance of a regional analgesia block for post-op pain control. It is requested of me because I am uniquely trained and qualified to perform this block and the surgeon is neither trained nor qualified to perform this procedure.    aJcob Hilton MD   2:05 PM          FOR [facility] ([facility]) ONLY:   Pain Team Contact information: please page the Pain Team Via [employer]. Search \"Pain\". During daytime hours, please page the attending first. At night please page the resident first.      "

## 2023-09-25 NOTE — ANESTHESIA POSTPROCEDURE EVALUATION
Patient: Kayley Menjivar    Procedure: Procedure(s):  RIGHT TOTAL KNEE ARTHROPLASTY       Anesthesia Type:  Spinal    Note:  Disposition: Inpatient   Postop Pain Control: Uneventful            Sign Out: Well controlled pain   PONV: No   Neuro/Psych: Uneventful            Sign Out: Acceptable/Baseline neuro status   Airway/Respiratory: Uneventful            Sign Out: Acceptable/Baseline resp. status   CV/Hemodynamics: Uneventful            Sign Out: Acceptable CV status   Other NRE: NONE   DID A NON-ROUTINE EVENT OCCUR? No    Event details/Postop Comments:  Moving bilateral lower extremities. Pain well controlled. Patient underwent general anesthesia due to spinal anesthetic seemingly not setting up (moving bilateral lower extremities). However, spinal providing pain relief in PACU. Regressing appropriately.           Last vitals:  Vitals Value Taken Time   /80 09/25/23 1345   Temp 36.2  C (97.1  F) 09/25/23 1246   Pulse 80 09/25/23 1354   Resp 12 09/25/23 1354   SpO2 98 % 09/25/23 1354   Vitals shown include unvalidated device data.    Electronically Signed By: Jacob Hliton MD  September 25, 2023  2:08 PM

## 2023-09-25 NOTE — ANESTHESIA PROCEDURE NOTES
Airway       Patient location during procedure: OR  Staff -        Anesthesiologist:  Jacob Hilton MD       CRNA: Radha Maravilla APRN CRNA       Other Anesthesia Staff: Chemo Nicolas RN       Performed By: SRNA and with CRNAs       Procedure performed by resident/fellow/CRNA in presence of a teaching physician.    Consent for Airway        Urgency: elective  Indications and Patient Condition       Indications for airway management: anna marie-procedural         Mask difficulty assessment: 1 - vent by mask    Final Airway Details       Final airway type: supraglottic airway    Supraglottic Airway Details        Type: LMA       Brand: Ambu AuraGain       LMA size: 4    Post intubation assessment        Placement verified by: capnometry, equal breath sounds and chest rise        Number of attempts at approach: 1       Number of other approaches attempted: 0       Secured with: silk tape       Ease of procedure: easy       Dentition: Intact and Unchanged

## 2023-09-25 NOTE — ANESTHESIA PROCEDURE NOTES
"Intrathecal Procedure Note    Pre-Procedure   Staff -        Anesthesiologist:  Jacob Hilton MD       Performed By: Anesthesiologist       Location: OR       Pre-Anesthestic Checklist: patient identified, IV checked, site marked, risks and benefits discussed, informed consent, monitors and equipment checked, pre-op evaluation and at physician/surgeon's request  Timeout:       Correct Patient: Yes        Correct Procedure: Yes        Correct Site: Yes        Correct Position: Yes   Procedure Documentation  Procedure: intrathecal       Patient Position: sitting       Patient Prep/Sterile Barriers: sterile gloves, mask, patient draped       Skin prep: Betadine       Insertion Site: L3-4. (midline approach).       Needle Gauge: 24.        Needle Length (Inches): 4        Spinal Needle Type: Pencan       Introducer used       Introducer: 20 G       # of attempts: 1 and  # of redirects:  0    Assessment/Narrative         Paresthesias: No.       CSF fluid: clear.       Opening pressure was cmH2O while  Sitting.       Comments:  Patient sitting on edge of OR bed, lower back cleaned and prepped in sterile fashion with betadine. 1% lido used to numb area. Introducer placed, spinal needle through introducer. Appropriate flow of CSF and confirmed with aspiration via syringe. Spinal dose given, 50 mg 2% mepivacaine PF IT. No complications.       FOR [facility] ([facility]) ONLY:   Pain Team Contact information: please page the Pain Team Via [employer]. Search \"Pain\". During daytime hours, please page the attending first. At night please page the resident first.      "

## 2023-09-25 NOTE — BRIEF OP NOTE
[facility]    Brief Operative Note    Pre-operative diagnosis: Osteoarthritis of right knee [M17.11]  Post-operative diagnosis Same as pre-operative diagnosis    Procedure: Procedure(s):  RIGHT TOTAL KNEE ARTHROPLASTY  Surgeon: Surgeon(s) and Role:     * Kishan Chandra MD - Primary     ASST   wai Maria  Anesthesia: Choice   Estimated Blood Loss: Less than 50 ml    Drains: Hemovac  Specimens: * No specimens in log *  Findings:   None.  Complications: None.  Implants: * No implants in log *

## 2023-09-25 NOTE — INTERVAL H&P NOTE
"I have reviewed the surgical (or preoperative) H&P that is linked to this encounter, and examined the patient. There are no significant changes    Clinical Conditions Present on Arrival:  Clinically Significant Risk Factors Present on Admission                  # Overweight: Estimated body mass index is 25.23 kg/m  as calculated from the following:    Height as of this encounter: 1.6 m (5' 3\").    Weight as of this encounter: 64.6 kg (142 lb 6.4 oz).       "

## 2023-09-25 NOTE — PLAN OF CARE
09/25/23 1635   Appointment Info   Signing Clinician's Name / Credentials (PT) Sherry Reis DPT   Rehab Comments (PT) No OT, pt SBA in BR,  can assist with dressing, has toilet seat rise and shower chair at home. Mobilizing well   Quick Adds   Quick Adds Certification   Living Environment   People in Home spouse   Current Living Arrangements house   Living Environment Comments 4 COOKIE bilateral railings, can reach both railings, all needs on main level, walk in shower ~ 3-4 inch lip to enter, has shower bench/chair she can use, has toilet seat riser, has FWW,  able to assist at DC.   Self-Care   Usual Activity Tolerance moderate   Current Activity Tolerance fair   Equipment Currently Used at Home none   Fall history within last six months no   Activity/Exercise/Self-Care Comment Pt IND at baseline   General Information   Onset of Illness/Injury or Date of Surgery 09/25/23   Referring Physician Kishan Chandra MD   Pertinent History of Current Problem (include personal factors and/or comorbidities that impact the POC) R TKA   Existing Precautions/Restrictions fall   Weight-Bearing Status - LLE weight-bearing as tolerated   Weight-Bearing Status - RLE weight-bearing as tolerated   Cognition   Affect/Mental Status (Cognition) WNL   Pain Assessment   Patient Currently in Pain No   Integumentary/Edema   Integumentary/Edema Comments Ace bandage to RLE   Posture    Posture Forward head position   Range of Motion (ROM)   ROM Comment RLE WFL post op   Strength (Manual Muscle Testing)   Strength Comments RLE WFL post op, no quad lag RLE SLR, no buckling with OOB mobility   Bed Mobility   Comment, (Bed Mobility) supine > sitting EOB SBA   Transfers   Comment, (Transfers) STS FWW CGA   Gait/Stairs (Locomotion)   Comment, (Gait/Stairs) 5' eval, FWW, CGA, lifts walker from floor, step to pattern   Balance   Balance Comments good dynamic balance with use of FWW   Sensory Examination   Sensory Perception  Comments intact to light touch   Clinical Impression   Criteria for Skilled Therapeutic Intervention Yes, treatment indicated   PT Diagnosis (PT) impaired IND with mobility from baseline   Influenced by the following impairments functional weakness, pain, impaired high level balance   Functional limitations due to impairments impaired IND with mobility from basleine   Clinical Presentation (PT Evaluation Complexity) Stable/Uncomplicated   Clinical Presentation Rationale clinical judgement   Clinical Decision Making (Complexity) low complexity   Planned Therapy Interventions (PT) balance training;bed mobility training;cryotherapy;gait training;home exercise program;patient/family education;ROM (range of motion);stair training;strengthening;transfer training   Risk & Benefits of therapy have been explained evaluation/treatment results reviewed;care plan/treatment goals reviewed;risks/benefits reviewed;patient   PT Total Evaluation Time   PT Eval, Low Complexity Minutes (67712) 10   Plan of Care Review   Plan of Care Reviewed With patient;spouse   Therapy Certification   Start of care date 09/25/23   Certification date from 09/25/23   Certification date to 09/27/23   Medical Diagnosis TKA   Physical Therapy Goals   PT Frequency 2x/day   PT Predicted Duration/Target Date for Goal Attainment 09/27/23   PT Goals Bed Mobility;Transfers;Gait;Stairs   PT: Bed Mobility Supine to/from sit;Supervision/stand-by assist;Rolling   PT: Transfers Supervision/stand-by assist;Bed to/from chair;Sit to/from stand;Assistive device   PT: Gait Supervision/stand-by assist;100 feet;Rolling walker   PT: Stairs Supervision/stand-by assist;4 stairs;Rail on both sides   Interventions   Interventions Quick Adds Therapeutic Activity;Therapeutic Procedure;Gait Training   PT Discharge Planning   PT Plan Seated TKA exercises, pt performed exercises prior to surgery, has booklet in rm we did not go through booklet during exercises this PM. Has FWW,  toilet seat riser, shower chair at home   PT Rationale for DC Rec defer to ortho   PT Brief overview of current status SBA-CGA with FWW, tolerated 150' ambulation with FWW no buckling or LOB.   Total Session Time   Total Session Time (sum of timed and untimed services) 10     [facility]  OUTPATIENT PHYSICAL THERAPY EVALUATION  PLAN OF TREATMENT FOR OUTPATIENT REHABILITATION  (COMPLETE FOR INITIAL CLAIMS ONLY)  Patient's Last Name, First Name, M.I.  YOB: 1945  Kayley Menjivar                        Provider's Name  [facility] Medical Record No.  [id number]                             Onset Date:  09/25/23   Start of Care Date:  09/25/23   Type:     _X_PT   ___OT   ___SLP Medical Diagnosis:  TKA              PT Diagnosis:  impaired IND with mobility from baseline Visits from SOC:  1     See note for plan of treatment, functional goals and certification details    I CERTIFY THE NEED FOR THESE SERVICES FURNISHED UNDER        THIS PLAN OF TREATMENT AND WHILE UNDER MY CARE     (Physician co-signature of this document indicates review and certification of the therapy plan).

## 2023-09-25 NOTE — PROGRESS NOTES
Patient vital signs are at baseline: Yes, on RA  Patient able to ambulate as they were prior to admission or with assist devices provided by therapies during their stay:  Yes, x1GB/W ambulated in the hallway  Patient MUST void prior to discharge:  Yes, in the BR  Patient able to tolerate oral intake:  Yes  Pain has adequate pain control using Oral analgesics:  Yes. Oral and PIV medication  Does patient have an identified :  Yes  Has goal D/C date and time been discussed with patient:  Yes    Patient POD#0, RTKA. CMS intact. HMV in place, Incisison DCI. Infusing NS @ 100 mL/hr, w/ intermittent abx.

## 2023-09-26 ENCOUNTER — APPOINTMENT (OUTPATIENT)
Dept: PHYSICAL THERAPY | Facility: CLINIC | Age: 78
End: 2023-09-26
Attending: ORTHOPAEDIC SURGERY
Payer: COMMERCIAL

## 2023-09-26 VITALS
DIASTOLIC BLOOD PRESSURE: 73 MMHG | SYSTOLIC BLOOD PRESSURE: 126 MMHG | BODY MASS INDEX: 25.23 KG/M2 | OXYGEN SATURATION: 97 % | HEIGHT: 63 IN | WEIGHT: 142.4 LBS | RESPIRATION RATE: 16 BRPM | HEART RATE: 70 BPM | TEMPERATURE: 97.7 F

## 2023-09-26 LAB
ANION GAP SERPL CALCULATED.3IONS-SCNC: 7 MMOL/L (ref 7–15)
BUN SERPL-MCNC: 7.2 MG/DL (ref 8–23)
CALCIUM SERPL-MCNC: 9 MG/DL (ref 8.8–10.2)
CHLORIDE SERPL-SCNC: 102 MMOL/L (ref 98–107)
CREAT SERPL-MCNC: 0.52 MG/DL (ref 0.51–0.95)
DEPRECATED HCO3 PLAS-SCNC: 26 MMOL/L (ref 22–29)
EGFRCR SERPLBLD CKD-EPI 2021: >90 ML/MIN/1.73M2
GLUCOSE BLDC GLUCOMTR-MCNC: 131 MG/DL (ref 70–99)
GLUCOSE SERPL-MCNC: 113 MG/DL (ref 70–99)
HGB BLD-MCNC: 9.2 G/DL (ref 11.7–15.7)
POTASSIUM SERPL-SCNC: 3.4 MMOL/L (ref 3.4–5.3)
SODIUM SERPL-SCNC: 135 MMOL/L (ref 136–145)

## 2023-09-26 PROCEDURE — 258N000003 HC RX IP 258 OP 636: Performed by: ORTHOPAEDIC SURGERY

## 2023-09-26 PROCEDURE — 999N000111 HC STATISTIC OT IP EVAL DEFER: Performed by: OCCUPATIONAL THERAPIST

## 2023-09-26 PROCEDURE — 250N000011 HC RX IP 250 OP 636: Performed by: ORTHOPAEDIC SURGERY

## 2023-09-26 PROCEDURE — 250N000013 HC RX MED GY IP 250 OP 250 PS 637: Performed by: ORTHOPAEDIC SURGERY

## 2023-09-26 PROCEDURE — 97530 THERAPEUTIC ACTIVITIES: CPT | Mod: GP,CQ | Performed by: PHYSICAL THERAPY ASSISTANT

## 2023-09-26 PROCEDURE — 80048 BASIC METABOLIC PNL TOTAL CA: CPT | Performed by: ORTHOPAEDIC SURGERY

## 2023-09-26 PROCEDURE — 85018 HEMOGLOBIN: CPT | Performed by: ORTHOPAEDIC SURGERY

## 2023-09-26 PROCEDURE — 36415 COLL VENOUS BLD VENIPUNCTURE: CPT | Performed by: ORTHOPAEDIC SURGERY

## 2023-09-26 PROCEDURE — 82962 GLUCOSE BLOOD TEST: CPT

## 2023-09-26 PROCEDURE — 97110 THERAPEUTIC EXERCISES: CPT | Mod: GP,CQ | Performed by: PHYSICAL THERAPY ASSISTANT

## 2023-09-26 PROCEDURE — 97116 GAIT TRAINING THERAPY: CPT | Mod: GP,CQ | Performed by: PHYSICAL THERAPY ASSISTANT

## 2023-09-26 RX ORDER — OXYCODONE HYDROCHLORIDE 5 MG/1
5-10 TABLET ORAL EVERY 6 HOURS PRN
Qty: 33 TABLET | Refills: 0 | Status: SHIPPED | OUTPATIENT
Start: 2023-09-26

## 2023-09-26 RX ORDER — DICLOFENAC SODIUM 75 MG/1
75 TABLET, DELAYED RELEASE ORAL 2 TIMES DAILY
Qty: 30 TABLET | Refills: 0 | Status: SHIPPED | OUTPATIENT
Start: 2023-09-26

## 2023-09-26 RX ADMIN — ACETAMINOPHEN 975 MG: 325 TABLET, FILM COATED ORAL at 06:49

## 2023-09-26 RX ADMIN — OXYCODONE HYDROCHLORIDE 5 MG: 5 TABLET ORAL at 03:01

## 2023-09-26 RX ADMIN — DOCUSATE SODIUM 100 MG: 100 CAPSULE, LIQUID FILLED ORAL at 08:46

## 2023-09-26 RX ADMIN — SENNOSIDES AND DOCUSATE SODIUM 1 TABLET: 50; 8.6 TABLET ORAL at 08:46

## 2023-09-26 RX ADMIN — CEFAZOLIN 1 G: 1 INJECTION, POWDER, FOR SOLUTION INTRAMUSCULAR; INTRAVENOUS at 02:57

## 2023-09-26 RX ADMIN — POLYETHYLENE GLYCOL 3350 17 G: 17 POWDER, FOR SOLUTION ORAL at 08:46

## 2023-09-26 RX ADMIN — ASPIRIN 81 MG: 81 TABLET, COATED ORAL at 08:46

## 2023-09-26 RX ADMIN — SODIUM CHLORIDE: 9 INJECTION, SOLUTION INTRAVENOUS at 00:25

## 2023-09-26 ASSESSMENT — ACTIVITIES OF DAILY LIVING (ADL)
ADLS_ACUITY_SCORE: 20

## 2023-09-26 NOTE — PROGRESS NOTES
Patient vital signs are at baseline: Yes  Patient able to ambulate as they were prior to admission or with assist devices provided by therapies during their stay:  Yes, Standby assist w/ walker  Patient MUST void prior to discharge:  Yes  Patient able to tolerate oral intake:  Yes  Pain has adequate pain control using Oral analgesics:  Yes, oxy and scheduled Tylenol  Does patient have an identified :  Yes  Has goal D/C date and time been discussed with patient:  Yes, anticipated today 9/26    POD 1. AO x4. CMS intact. PIV SL. Dressing CDI with hemovac in place.

## 2023-09-26 NOTE — PROGRESS NOTES
"ORTHOPEDIC LOWER EXTREMITY PROGRESS NOTE    POD#1  Patient is a 77 year old female who underwent Procedure(s):  RIGHT TOTAL KNEE ARTHROPLASTY on 2023.   Doing well.  Normal healing wound.  No immediate surgical complications identified.  No excessive bleeding  Pain well-controlled.  Tolerating physical therapy and rehabilitation well.      Vitals:   Blood pressure 126/73, pulse 70, temperature 97.7  F (36.5  C), temperature source Oral, resp. rate 16, height 1.6 m (5' 3\"), weight 64.6 kg (142 lb 6.4 oz), SpO2 97 %.  Temp (24hrs), Av.4  F (36.3  C), Min:97.1  F (36.2  C), Max:97.9  F (36.6  C)      Drains:  hemovac    EXAM   The patient is awake and alert.  Calves are soft and non-tender.   Sensation is intact.  Dorsiflexion and plantar flexion is intact.  Foot warm with nl cap refill.  The incision is covered with gauze and tegaderm .     Labs:   Recent Labs   Lab Test 23  0751   HGB 9.2*       ASSESSMENT  S/p R TKA   PLAN  1. DVT prophylaxis: aspirin  2. Weight Bearing: WBAT (Weight bearing as tolerated).  3. Anticipated discharge date today . Discharge to Home with Outpatient Treatment.  4. Cont Pain Control Oxycodone, Tylenol, and atarax    Valarie Lowry PA-C  [facility]        "

## 2023-09-26 NOTE — PLAN OF CARE
OT: orders received and chart reviewed and discussed with PT. Pt reports no OT needs, pt SBA in BR,  can assist with dressing, has toilet seat rise and shower chair at home. Mobilizing well with PT. Will complete OT orders

## 2023-09-26 NOTE — DISCHARGE SUMMARY
Patient vital signs are at baseline: Yes  Patient able to ambulate as they were prior to admission or with assist devices provided by therapies during their stay:  Yes  Patient MUST void prior to discharge:  Yes  Patient able to tolerate oral intake:  Yes  Pain has adequate pain control using Oral analgesics:  Yes  Does patient have an identified :  Yes  Has goal D/C date and time been discussed with patient:  Yes    Patient A&Ox4. CMS inatct. Incision DCI. Pain been managed well with PRN and schedule medications. Patient discharging home via. AVS printed instructions reviewed with Pt, copy of AVS  and medications handed to Pt.

## 2023-09-28 NOTE — OP NOTE
Kayley Menjivar, [facility] [id number]    Date of birth 1945    Surgical date 9/25//2023  Surgeon of record Jermanynor    Operative procedure/right total knee replacement    Anesthesia , femoral nerve block, sedation    Assistant Kd Maria RN FA    Hemovac 1    Hassan catheter none    Medications 2 g Ancef given preop plan 24 hours dosing postop    D VT prophylaxis aspirin therapy sequential Compression devices and early mobilization    Implant placed right total knee Biomet system 71 fixed fixed I-beam post tibial tray 62.5 open box femoral component 14 mm polyethylene polyethylene insert fitting a 71/75 tray and the patella was a 3434 mm all poly patellar button.    Indication made timeout was requested proceeded then    Tourniquet was inflated to 50 made a midline incision with a medial parapatellar arthrotomy.  Debrided with a fat pad allowing easier to place the patella out in extension and then resected that size at 234 drilled the holes at the extra bone off was nice and secure right to the level of the ligament    Then flexed the knee down into my do the guiding system was used to produce a 5 degree valgus cut of the distal femur followed by open box open open and chamfers were cut out.  To correct the varus deformity debrided away all aspects of it release it to the posterior medial corner medial mid coronal coronal plane laterally.  Debride out the meniscus ACL PCL etc.        Then centering the extra medullary guiding system for the tibia in line with the talus and about a 3 degree posterior slope cut that out and then made a center post total referencing rotation and alignment.  Withdrew that trials were then done I deep in the whole a little bit just to make a better cement mantle.  About 1/4 inch deep and then proceeded then withdrawal that trials otherwise it cemented all 3 components and simultaneously after doing trials.  Best fit was size 14 thick posterior stabilized.  Good flexion and extension space  balance    Irrigated out then with Betadine Betadine solution followed by 500 cc of saline solution.  Post capsular closure test did reveal excellent range of motion and the patella tracked well.  Deep retinaculum was reapproximated with 0 Ethibond followed by #2 running strata fix.  At that point put a drain in the deep subcu 0 Vicryl and 2-0 Vicryl 3-0 Monocryl perineum perineal PE or any AL bone on the cement skin cement or glue plus the bandage.  Compressive wrap applied patient transferred awake awake alert recovering after letting the tourniquet down documented sponge and needle counts were correct x2.  No other complications patient tolerated the procedure well Dr. Haynes dictating operative note on Nita Kayley Menjivar thank you

## 2023-10-05 ENCOUNTER — DOCUMENTATION ONLY (OUTPATIENT)
Dept: OTHER | Facility: CLINIC | Age: 78
End: 2023-10-05
Payer: COMMERCIAL

## (undated) DEVICE — DRSG KERLIX FLUFFS X5

## (undated) DEVICE — SUCTION IRR SYSTEM W/O TIP INTERPULSE HANDPIECE 0210-100-000

## (undated) DEVICE — DRSG ADAPTIC 3X8" 6113

## (undated) DEVICE — ESU GROUND PAD UNIVERSAL W/O CORD

## (undated) DEVICE — SU VICRYL 0 CT-1 27" UND J260H

## (undated) DEVICE — SU VICRYL 2-0 CP-1 27" UND J266H

## (undated) DEVICE — STPL SKIN 35W 6.9MM  PXW35

## (undated) DEVICE — PACK TOTAL KNEE SOP15TKFSD

## (undated) DEVICE — HOOD SURG T7PLUS PEEL AWAY FACE SHIELD STRL LF 0416-801-100

## (undated) DEVICE — BONE CLEANING TIP INTERPULSE  0210-010-000

## (undated) DEVICE — NDL 18GA 1.5" 305196

## (undated) DEVICE — DECANTER BAG 2002S

## (undated) DEVICE — GLOVE BIOGEL PI MICRO INDICATOR UNDERGLOVE SZ 8.0 48980

## (undated) DEVICE — PREP SKIN SCRUB TRAY 4461A

## (undated) DEVICE — NDL 22GA 1.5"

## (undated) DEVICE — SOL NACL 0.9% IRRIG 1000ML BOTTLE 2F7124

## (undated) DEVICE — SU ETHIBOND 0 CTX CR  8X18" CX31D

## (undated) DEVICE — LINEN TOWEL PACK X5 5464

## (undated) DEVICE — DRAIN ROUND W/RESERV KIT JACKSON PRATT 10FR 400ML SU130-402D

## (undated) DEVICE — SYR 10ML FINGER CONTROL W/O NDL 309695

## (undated) DEVICE — MANIFOLD NEPTUNE 4 PORT 700-20

## (undated) DEVICE — WRAP EZY KNEE 1213PP

## (undated) DEVICE — SU PROLENE 0 CT 30" 8434H

## (undated) DEVICE — SOL NACL 0.9% INJ 1000ML BAG 2B1324X

## (undated) DEVICE — GLOVE BIOGEL PI ULTRATOUCH SZ 8.0 41180

## (undated) DEVICE — BONE CEMENT MIXEVAC III HI VAC KIT  0206-015-000

## (undated) DEVICE — SOL WATER IRRIG 1000ML BOTTLE 2F7114

## (undated) RX ORDER — LIDOCAINE HYDROCHLORIDE 10 MG/ML
INJECTION, SOLUTION EPIDURAL; INFILTRATION; INTRACAUDAL; PERINEURAL
Status: DISPENSED
Start: 2023-09-25

## (undated) RX ORDER — HYDROMORPHONE HYDROCHLORIDE 1 MG/ML
INJECTION, SOLUTION INTRAMUSCULAR; INTRAVENOUS; SUBCUTANEOUS
Status: DISPENSED
Start: 2023-09-25

## (undated) RX ORDER — VANCOMYCIN HYDROCHLORIDE 1 G/20ML
INJECTION, POWDER, LYOPHILIZED, FOR SOLUTION INTRAVENOUS
Status: DISPENSED
Start: 2023-09-25

## (undated) RX ORDER — ACETAMINOPHEN 160 MG
TABLET,DISINTEGRATING ORAL
Status: DISPENSED
Start: 2023-09-25

## (undated) RX ORDER — TRANEXAMIC ACID 10 MG/ML
INJECTION, SOLUTION INTRAVENOUS
Status: DISPENSED
Start: 2023-09-25

## (undated) RX ORDER — PROPOFOL 10 MG/ML
INJECTION, EMULSION INTRAVENOUS
Status: DISPENSED
Start: 2023-09-25

## (undated) RX ORDER — ONDANSETRON 2 MG/ML
INJECTION INTRAMUSCULAR; INTRAVENOUS
Status: DISPENSED
Start: 2023-09-25

## (undated) RX ORDER — FENTANYL CITRATE 50 UG/ML
INJECTION, SOLUTION INTRAMUSCULAR; INTRAVENOUS
Status: DISPENSED
Start: 2023-09-25

## (undated) RX ORDER — DEXAMETHASONE SODIUM PHOSPHATE 4 MG/ML
INJECTION, SOLUTION INTRA-ARTICULAR; INTRALESIONAL; INTRAMUSCULAR; INTRAVENOUS; SOFT TISSUE
Status: DISPENSED
Start: 2023-09-25